# Patient Record
Sex: FEMALE | Race: WHITE | NOT HISPANIC OR LATINO | Employment: PART TIME | URBAN - METROPOLITAN AREA
[De-identification: names, ages, dates, MRNs, and addresses within clinical notes are randomized per-mention and may not be internally consistent; named-entity substitution may affect disease eponyms.]

---

## 2024-08-23 RX ORDER — FLUTICASONE PROPIONATE 50 MCG
2 SPRAY, SUSPENSION (ML) NASAL DAILY
COMMUNITY
Start: 2024-07-15

## 2024-08-23 RX ORDER — ESTRADIOL 2 MG/1
2 TABLET ORAL EVERY MORNING
COMMUNITY

## 2024-08-23 RX ORDER — DEXAMETHASONE SODIUM PHOSPHATE 1 MG/ML
1 SOLUTION/ DROPS OPHTHALMIC
COMMUNITY
Start: 2024-06-25

## 2024-08-23 RX ORDER — ATORVASTATIN CALCIUM 10 MG/1
10 TABLET, FILM COATED ORAL NIGHTLY
COMMUNITY
Start: 2024-05-11

## 2024-08-23 RX ORDER — PROGESTERONE 100 MG/1
100 CAPSULE ORAL
COMMUNITY

## 2024-08-23 RX ORDER — LOSARTAN POTASSIUM 25 MG/1
25 TABLET ORAL DAILY
COMMUNITY
Start: 2024-05-28

## 2024-08-23 RX ORDER — ASPIRIN 81 MG/1
81 TABLET ORAL DAILY
COMMUNITY

## 2024-08-23 RX ORDER — CHLORTHALIDONE 25 MG/1
12.5 TABLET ORAL DAILY
COMMUNITY
Start: 2024-07-17

## 2024-09-12 ENCOUNTER — OFFICE VISIT (OUTPATIENT)
Dept: CARDIOLOGY | Facility: CLINIC | Age: 74
End: 2024-09-12
Payer: MEDICARE

## 2024-09-12 VITALS
HEIGHT: 63 IN | WEIGHT: 129 LBS | HEART RATE: 68 BPM | SYSTOLIC BLOOD PRESSURE: 218 MMHG | BODY MASS INDEX: 22.86 KG/M2 | DIASTOLIC BLOOD PRESSURE: 86 MMHG

## 2024-09-12 DIAGNOSIS — G45.9 TIA (TRANSIENT ISCHEMIC ATTACK): ICD-10-CM

## 2024-09-12 DIAGNOSIS — I25.10 ATHEROSCLEROSIS OF NATIVE CORONARY ARTERY OF NATIVE HEART WITHOUT ANGINA PECTORIS: Primary | ICD-10-CM

## 2024-09-12 DIAGNOSIS — E78.5 HYPERLIPIDEMIA LDL GOAL <70: ICD-10-CM

## 2024-09-12 DIAGNOSIS — I10 BENIGN ESSENTIAL HYPERTENSION: ICD-10-CM

## 2024-09-12 DIAGNOSIS — R76.0 ANTICARDIOLIPIN ANTIBODY POSITIVE: ICD-10-CM

## 2024-09-12 DIAGNOSIS — I35.1 NONRHEUMATIC AORTIC VALVE INSUFFICIENCY: ICD-10-CM

## 2024-09-12 RX ORDER — PANTOPRAZOLE SODIUM 20 MG/1
20 TABLET, DELAYED RELEASE ORAL DAILY
COMMUNITY

## 2024-09-12 NOTE — LETTER
September 15, 2024     Aida Orantes MD  [address] [place] [id number]    Patient: Araceli Blair   YOB: 1950   Date of Visit: 9/12/2024       Dear Aiad Orantes MD    Araceli Blair was in my office today. Below is a copy of my note.    If you have questions, please do not hesitate to call me. I look forward to following Araceli along with you.         Sincerely,        Lorne Camacho MD        CC: No Recipients      CARDIOLOGY INITIAL CONSULT       Chief Complaint  Establish Care, Coronary Artery Disease, Hyperlipidemia, and Hypertension    Reason for consultation  Transient ischemic attack    Subjective           Araceli lBair presents to [facility] CARDIOLOGY  History of Present Illness    Ms. Blair is here to establish cardiac care.  He was previously following up with Dr. Cameron Campbell at [facility] for cardiac care.  She underwent angioplasty with stent placement to right coronary artery at [facility] in 2021, in the setting of unstable angina.  She has been on aspirin and statin since then.  She had 3 episodes of TIA in the past.  The last episode was in 2024, most of the episodes are associated with either aphasia or difficulty remembering names.  The most recent episode was associated with blurry vision.  She was recently evaluated by a neurologist at [place], subsequent workup included an echocardiogram and 30-day event monitor study.  Anticardiolipin antibody was noted to be positive and she was advised to follow-up with a hematologist.    Today, she denies any new complaints.  No recent episodes of chest pain.  Denies shortness of breath, palpitations or dizziness.  She is working part-time at this time.      Past History:    Coronary artery disease : Index presentation is unstable angina (DIAZ) s/p stent placement to mid RCA (90%) and distal RCA(70%) .  Distal LAD had 50% lesion and FFR negative.  Done at [place],  [place]    Multiple episodes of transient ischemic attacks, presenting symptom mostly aphasia.  Essential hypertension  Mixed hyperlipidemia    Medical History:  Past Medical History:   Diagnosis Date   • TIA (transient ischemic attack)        Surgical History: has a past surgical history that includes Coronary stent placement (2021).     Family History: Reviewed, no family history of premature coronary disease    Social History: reports that she has never smoked. She has never used smokeless tobacco. Alcohol use questions deferred to the physician. Drug use questions deferred to the physician.    Allergies: Patient has no known allergies.    Current Outpatient Medications on File Prior to Visit   Medication Sig   • aspirin 81 MG EC tablet Take 1 tablet by mouth Daily.   • atorvastatin (LIPITOR) 10 MG tablet Take 1 tablet by mouth Every Night.   • chlorthalidone (HYGROTON) 25 MG tablet Take 12.5 tablets by mouth Daily.   • dexAMETHasone (DECADRON) 0.1 % ophthalmic solution Administer 1 drop to the right eye.   • estradiol (ESTRACE) 2 MG tablet Take 1 tablet by mouth Every Morning.   • fluticasone (FLONASE) 50 MCG/ACT nasal spray 2 sprays Daily.   • losartan (COZAAR) 25 MG tablet Take 1 tablet by mouth Daily.   • Multiple Vitamin (MULTIVITAMINS PO)    • pantoprazole (Protonix) 20 MG EC tablet Take 1 tablet by mouth Daily.   • Progesterone (PROMETRIUM) 100 MG capsule Take 1 capsule by mouth every night at bedtime.     No current facility-administered medications on file prior to visit.          Review of Systems   Constitutional:  Negative for fatigue, unexpected weight gain and unexpected weight loss.   Eyes:  Negative for double vision.   Respiratory:  Negative for cough, shortness of breath and wheezing.    Cardiovascular:  Negative for chest pain, palpitations and leg swelling.   Gastrointestinal:  Negative for abdominal pain, nausea and vomiting.   Endocrine: Negative for cold intolerance, heat intolerance, polydipsia  "and polyuria.   Musculoskeletal:  Negative for arthralgias and back pain.   Skin:  Negative for color change.   Neurological:  Negative for dizziness, syncope, weakness and headache.   Hematological:  Does not bruise/bleed easily.        Objective    BP (!) 218/86   Pulse 68   Ht 160 cm (63\")   Wt 58.5 kg (129 lb)   BMI 22.85 kg/m²       Physical Exam  Constitutional:       General: She is awake. She is not in acute distress.     Appearance: Normal appearance.   Eyes:      Extraocular Movements: Extraocular movements intact.      Pupils: Pupils are equal, round, and reactive to light.   Neck:      Thyroid: No thyromegaly.      Vascular: No carotid bruit or JVD.   Cardiovascular:      Rate and Rhythm: Normal rate and regular rhythm.      Chest Wall: PMI is not displaced.      Heart sounds: Normal heart sounds, S1 normal and S2 normal. No murmur heard.     No friction rub. No gallop. No S3 or S4 sounds.   Pulmonary:      Effort: Pulmonary effort is normal. No respiratory distress.      Breath sounds: Normal breath sounds. No wheezing, rhonchi or rales.   Abdominal:      General: Bowel sounds are normal.      Palpations: Abdomen is soft.      Tenderness: There is no abdominal tenderness.   Musculoskeletal:      Cervical back: Neck supple.      Right lower leg: No edema.      Left lower leg: No edema.   Skin:     Nails: There is no clubbing.   Neurological:      General: No focal deficit present.      Mental Status: She is alert and oriented to person, place, and time.           Result Review:     The following data was reviewed by: Lorne Camacho MD on 09/12/2024:    No recent labs available for review    Data reviewed: Cardiology studies                                   Assessment and Plan        Diagnoses and all orders for this visit:    1. Atherosclerosis of native coronary artery of native heart without angina pectoris (Primary)  Assessment & Plan:  She is currently stable with no angina.  LV systolic " function is preserved per echocardiogram done last year.  Recommend to continue aspirin, statin.      2. TIA (transient ischemic attack)  Assessment & Plan:  3 episodes of transient ischemic attacks per history.  She was recently evaluated by neurologist at [facility].  Echocardiogram, 30-day extended Holter monitor study were unremarkable.  She is already on a statin and aspirin.  She will need better blood pressure control.  The option of loop recorder implantation was discussed with the patient to rule out arrhythmia as.  Patient prefers not to proceed with it.  Will also make referral to hematologist due to positive anticardiolipin antibody as advised by neurologist.      3. Benign essential hypertension  Assessment & Plan:  Blood pressure significantly elevated in the office today, multiple readings were taken.  She is completely asymptomatic at this time.  She also reports that is generally high in doctors offices and well-controlled systolic blood pressure mostly in 130s at home.  She already took chlorthalidone and losartan for the day.  Encouraged to follow low-sodium diet, and keep home blood pressure log at home.  She will call us back if blood pressures persistently elevated.      4. Hyperlipidemia LDL goal <70  Assessment & Plan:  Lipid control uncertain, no recent lipid panel available for review.  Will get the latest labs from PCP office and adjust the dose of atorvastatin as needed.      5. Nonrheumatic aortic valve insufficiency  Assessment & Plan:  Mild aortic insufficiency per echocardiogram in 2023.  LV systolic function and chamber dimensions are normal.  We will repeat the echo again 2025 or earlier if needed.      6. Anticardiolipin antibody positive  Assessment & Plan:  Anticardiolipin antibody was noted to be positive, which was done as part of workup for recurrent transient ischemic attacks.  Neurologist recommended referral to hematologist and patient wants to have this done at  [place].  We will make referrals to Dr. Austin.     Orders:  -     Ambulatory Referral to Hematology            I spent 23 minutes caring for Araceli on this date of service. This time includes time spent by me in the following activities:reviewing tests, obtaining and/or reviewing a separately obtained history, performing a medically appropriate examination and/or evaluation , ordering medications, tests, or procedures, and documenting information in the medical record    Follow Up     Return in about 1 year (around 9/12/2025) for Next scheduled follow up.    Patient was given instructions and counseling regarding her condition or for health maintenance advice. Please see specific information pulled into the AVS if appropriate.

## 2024-09-15 PROBLEM — E78.5 HYPERLIPIDEMIA LDL GOAL <70: Status: ACTIVE | Noted: 2024-09-15

## 2024-09-15 PROBLEM — I35.1 AORTIC VALVE INSUFFICIENCY: Status: ACTIVE | Noted: 2021-09-15

## 2024-09-15 PROBLEM — I10 BENIGN ESSENTIAL HYPERTENSION: Status: ACTIVE | Noted: 2021-09-15

## 2024-09-15 PROBLEM — G45.9 TIA (TRANSIENT ISCHEMIC ATTACK): Status: ACTIVE | Noted: 2024-09-15

## 2024-09-15 PROBLEM — I25.10 ATHEROSCLEROSIS OF NATIVE CORONARY ARTERY OF NATIVE HEART WITHOUT ANGINA PECTORIS: Status: ACTIVE | Noted: 2021-09-15

## 2024-09-15 NOTE — PROGRESS NOTES
CARDIOLOGY INITIAL CONSULT       Chief Complaint  Establish Care, Coronary Artery Disease, Hyperlipidemia, and Hypertension    Reason for consultation  Transient ischemic attack    Subjective            Araceli Blair presents to [facility] CARDIOLOGY  History of Present Illness    Ms. Blair is here to establish cardiac care.  He was previously following up with Dr. Cameron Campbell at [facility] for cardiac care.  She underwent angioplasty with stent placement to right coronary artery at [facility] in 2021, in the setting of unstable angina.  She has been on aspirin and statin since then.  She had 3 episodes of TIA in the past.  The last episode was in 2024, most of the episodes are associated with either aphasia or difficulty remembering names.  The most recent episode was associated with blurry vision.  She was recently evaluated by a neurologist at [place], subsequent workup included an echocardiogram and 30-day event monitor study.  Anticardiolipin antibody was noted to be positive and she was advised to follow-up with a hematologist.    Today, she denies any new complaints.  No recent episodes of chest pain.  Denies shortness of breath, palpitations or dizziness.  She is working part-time at this time.      Past History:    Coronary artery disease : Index presentation is unstable angina (DIAZ) s/p stent placement to mid RCA (90%) and distal RCA(70%) .  Distal LAD had 50% lesion and FFR negative.  Done at [place], [place]    Multiple episodes of transient ischemic attacks, presenting symptom mostly aphasia.  Essential hypertension  Mixed hyperlipidemia    Medical History:  Past Medical History:   Diagnosis Date    TIA (transient ischemic attack)        Surgical History: has a past surgical history that includes Coronary stent placement (2021).     Family History: Reviewed, no family history of premature coronary disease    Social History: reports that  "she has never smoked. She has never used smokeless tobacco. Alcohol use questions deferred to the physician. Drug use questions deferred to the physician.    Allergies: Patient has no known allergies.    Current Outpatient Medications on File Prior to Visit   Medication Sig    aspirin 81 MG EC tablet Take 1 tablet by mouth Daily.    atorvastatin (LIPITOR) 10 MG tablet Take 1 tablet by mouth Every Night.    chlorthalidone (HYGROTON) 25 MG tablet Take 12.5 tablets by mouth Daily.    dexAMETHasone (DECADRON) 0.1 % ophthalmic solution Administer 1 drop to the right eye.    estradiol (ESTRACE) 2 MG tablet Take 1 tablet by mouth Every Morning.    fluticasone (FLONASE) 50 MCG/ACT nasal spray 2 sprays Daily.    losartan (COZAAR) 25 MG tablet Take 1 tablet by mouth Daily.    Multiple Vitamin (MULTIVITAMINS PO)     pantoprazole (Protonix) 20 MG EC tablet Take 1 tablet by mouth Daily.    Progesterone (PROMETRIUM) 100 MG capsule Take 1 capsule by mouth every night at bedtime.     No current facility-administered medications on file prior to visit.          Review of Systems   Constitutional:  Negative for fatigue, unexpected weight gain and unexpected weight loss.   Eyes:  Negative for double vision.   Respiratory:  Negative for cough, shortness of breath and wheezing.    Cardiovascular:  Negative for chest pain, palpitations and leg swelling.   Gastrointestinal:  Negative for abdominal pain, nausea and vomiting.   Endocrine: Negative for cold intolerance, heat intolerance, polydipsia and polyuria.   Musculoskeletal:  Negative for arthralgias and back pain.   Skin:  Negative for color change.   Neurological:  Negative for dizziness, syncope, weakness and headache.   Hematological:  Does not bruise/bleed easily.        Objective     BP (!) 218/86   Pulse 68   Ht 160 cm (63\")   Wt 58.5 kg (129 lb)   BMI 22.85 kg/m²       Physical Exam  Constitutional:       General: She is awake. She is not in acute distress.     Appearance: " Normal appearance.   Eyes:      Extraocular Movements: Extraocular movements intact.      Pupils: Pupils are equal, round, and reactive to light.   Neck:      Thyroid: No thyromegaly.      Vascular: No carotid bruit or JVD.   Cardiovascular:      Rate and Rhythm: Normal rate and regular rhythm.      Chest Wall: PMI is not displaced.      Heart sounds: Normal heart sounds, S1 normal and S2 normal. No murmur heard.     No friction rub. No gallop. No S3 or S4 sounds.   Pulmonary:      Effort: Pulmonary effort is normal. No respiratory distress.      Breath sounds: Normal breath sounds. No wheezing, rhonchi or rales.   Abdominal:      General: Bowel sounds are normal.      Palpations: Abdomen is soft.      Tenderness: There is no abdominal tenderness.   Musculoskeletal:      Cervical back: Neck supple.      Right lower leg: No edema.      Left lower leg: No edema.   Skin:     Nails: There is no clubbing.   Neurological:      General: No focal deficit present.      Mental Status: She is alert and oriented to person, place, and time.           Result Review :     The following data was reviewed by: Lorne Camacho MD on 09/12/2024:    No recent labs available for review    Data reviewed: Cardiology studies                                   Assessment and Plan        Diagnoses and all orders for this visit:    1. Atherosclerosis of native coronary artery of native heart without angina pectoris (Primary)  Assessment & Plan:  She is currently stable with no angina.  LV systolic function is preserved per echocardiogram done last year.  Recommend to continue aspirin, statin.      2. TIA (transient ischemic attack)  Assessment & Plan:  3 episodes of transient ischemic attacks per history.  She was recently evaluated by neurologist at [facility].  Echocardiogram, 30-day extended Holter monitor study were unremarkable.  She is already on a statin and aspirin.  She will need better blood pressure control.  The option of  loop recorder implantation was discussed with the patient to rule out arrhythmia as.  Patient prefers not to proceed with it.  Will also make referral to hematologist due to positive anticardiolipin antibody as advised by neurologist.      3. Benign essential hypertension  Assessment & Plan:  Blood pressure significantly elevated in the office today, multiple readings were taken.  She is completely asymptomatic at this time.  She also reports that is generally high in doctors offices and well-controlled systolic blood pressure mostly in 130s at home.  She already took chlorthalidone and losartan for the day.  Encouraged to follow low-sodium diet, and keep home blood pressure log at home.  She will call us back if blood pressures persistently elevated.      4. Hyperlipidemia LDL goal <70  Assessment & Plan:  Lipid control uncertain, no recent lipid panel available for review.  Will get the latest labs from PCP office and adjust the dose of atorvastatin as needed.      5. Nonrheumatic aortic valve insufficiency  Assessment & Plan:  Mild aortic insufficiency per echocardiogram in 2023.  LV systolic function and chamber dimensions are normal.  We will repeat the echo again 2025 or earlier if needed.      6. Anticardiolipin antibody positive  Assessment & Plan:  Anticardiolipin antibody was noted to be positive, which was done as part of workup for recurrent transient ischemic attacks.  Neurologist recommended referral to hematologist and patient wants to have this done at [place].  We will make referrals to Dr. Austin.     Orders:  -     Ambulatory Referral to Hematology            I spent 23 minutes caring for Araceli on this date of service. This time includes time spent by me in the following activities:reviewing tests, obtaining and/or reviewing a separately obtained history, performing a medically appropriate examination and/or evaluation , ordering medications, tests, or procedures, and documenting information  in the medical record    Follow Up     Return in about 1 year (around 9/12/2025) for Next scheduled follow up.    Patient was given instructions and counseling regarding her condition or for health maintenance advice. Please see specific information pulled into the AVS if appropriate.

## 2024-09-15 NOTE — ASSESSMENT & PLAN NOTE
3 episodes of transient ischemic attacks per history.  She was recently evaluated by neurologist at [facility].  Echocardiogram, 30-day extended Holter monitor study were unremarkable.  She is already on a statin and aspirin.  She will need better blood pressure control.  The option of loop recorder implantation was discussed with the patient to rule out arrhythmia as.  Patient prefers not to proceed with it.  Will also make referral to hematologist due to positive anticardiolipin antibody as advised by neurologist.

## 2024-09-15 NOTE — ASSESSMENT & PLAN NOTE
Mild aortic insufficiency per echocardiogram in 2023.  LV systolic function and chamber dimensions are normal.  We will repeat the echo again 2025 or earlier if needed.

## 2024-09-15 NOTE — ASSESSMENT & PLAN NOTE
Anticardiolipin antibody was noted to be positive, which was done as part of workup for recurrent transient ischemic attacks.  Neurologist recommended referral to hematologist and patient wants to have this done at [place].  We will make referrals to Dr. Austin.

## 2024-09-15 NOTE — ASSESSMENT & PLAN NOTE
She is currently stable with no angina.  LV systolic function is preserved per echocardiogram done last year.  Recommend to continue aspirin, statin.

## 2024-09-15 NOTE — ASSESSMENT & PLAN NOTE
Blood pressure significantly elevated in the office today, multiple readings were taken.  She is completely asymptomatic at this time.  She also reports that is generally high in doctors offices and well-controlled systolic blood pressure mostly in 130s at home.  She already took chlorthalidone and losartan for the day.  Encouraged to follow low-sodium diet, and keep home blood pressure log at home.  She will call us back if blood pressures persistently elevated.

## 2024-09-15 NOTE — ASSESSMENT & PLAN NOTE
Lipid control uncertain, no recent lipid panel available for review.  Will get the latest labs from PCP office and adjust the dose of atorvastatin as needed.

## 2024-10-07 ENCOUNTER — TELEPHONE (OUTPATIENT)
Dept: CARDIOLOGY | Facility: CLINIC | Age: 74
End: 2024-10-07
Payer: MEDICARE

## 2024-10-07 NOTE — TELEPHONE ENCOUNTER
----- Message from Patricia H sent at 10/4/2024  3:14 PM EDT -----  Regarding: FW: Unusual pain  Contact: [phone]    ----- Message -----  From: Araceli Blair  Sent: 10/4/2024   2:49 PM EDT  To: [facility]  Subject: Unusual pain                                     A sharp pain under my left shoulder blade woke me early this morning.     Another sharp pain woke me as I was sleeping in recliner.   I know this could indicate a heart problem.  No other problems are present.    I just had a routine exam and blood pressure  Was 126/80.   I have had stress concerning my son and daughter. Granddaughter having brain surgery and son lives in [place] WHERE FLOOD has been catastrophic.  He is safe.  I feel I’m handling this in a normal way but I don’t understand this sharp stabbing pain

## 2025-05-12 ENCOUNTER — TELEPHONE (OUTPATIENT)
Dept: FAMILY MEDICINE CLINIC | Facility: CLINIC | Age: 75
End: 2025-05-12
Payer: MEDICARE

## 2025-05-12 NOTE — TELEPHONE ENCOUNTER
Caller: Araceli Blair    Relationship to patient: Self    Best call back number: [phone]    Chief complaint: ESTABLISH CARE WITH DR. DIALLO.    Type of visit: NEW PATIENT APPOINTMENT    Requested date: WEEK OF 07.21.2025     Additional notes:PATIENT REQUESTING APPOINTMENT SAME DATE/TIME AS SPOUSE.    NO AVAILABLE NEW PATIENT APPOINTMENTS ON SAME DAY/TIME FOR HUB TO SCHEDULE.     HUB UPDATED REGISTRATION.    CONTACT PATIENT TO ADVISE.